# Patient Record
(demographics unavailable — no encounter records)

---

## 2024-11-25 NOTE — HISTORY OF PRESENT ILLNESS
[FreeTextEntry1] : New patient [de-identified] : Ms. CORETTA SALAS 79 year  female  with a PMH Hypothyroidism,  h/o breast cancer(s/p b/l mastectomy with reconstruction, s/p breast implant) GERD,  hemangioma, IBS, adrenal adenoma, h/o kidney stone  present to the office for a physical exam and to  establish care with a new PCP. Patient feel tired, fatigue recently had Herpes Zoster which was treated Valtrex for couple days.

## 2024-11-25 NOTE — REVIEW OF SYSTEMS
[TextEntry] : Constitutional: Denies fever,  c/o fatigue, has some   recent changes in the weight(gain weight) Head: C/o headache on/off,  denies  dizziness Eyes: Denies diplopia, tearing or pain Ears: Denies earache, tinnitus, hearing loss Nose: Denies nasal obstruction,  epistaxis Throat: Denies throat pain Neck: Denies stiffness, muscle tenderness Chest: Denies cough, SOB, wheezing, chest congestion CV: Denies chest pain,  c/o palpitation on/off GI: Denies abdominal pain, constipation,  c/o heartburn on/off Genitourinary: Denies dysuria, urinary urgency, c/o nocturia Musculoskeletal: Denies joint pain Neuro: Denies changes in mental status Psychiatric: Denies depressive symptoms, has some  change in sleep habits(wakes up at night to urinate)

## 2024-11-25 NOTE — PLAN
[FreeTextEntry1] : Ms. CORETTA SALAS 79 year  female  with a PMH Hypothyroidism,  h/o breast cancer(s/p b/l mastectomy with reconstruction, s/p breast implant) GERD,  hemangioma, IBS, adrenal adenoma, h/o kidney stone  present to the office for a physical exam. Well adult exam is performed. Recommend  to do a blood test(another day, fasting), further management will depend on the blood test results.  EKG was done and reviewed  Hypothyroidism continue  Synthroid 88 mcg qd Anxiety continue klonopin 1mg tid, prozac 10mg, effexor 37.5 GERD continue pepcid Do dexa test For a hemangioma do  an abdominal u/s Urine  incontinence continue myrbetrig- f/u with urologist F/u with dermatologist for a skin exam  RTC to f/u in 2 wks. Patient is verbalized understanding

## 2024-11-25 NOTE — HEALTH RISK ASSESSMENT
[Poor] : ~his/her~ current health as poor [Fair] :  ~his/her~ mood as fair [Yes] : Yes [2 - 4 times a month (2 pts)] : 2-4 times a month (2 points) [1 or 2 (0 pts)] : 1 or 2 (0 points) [Never (0 pts)] : Never (0 points) [No] : In the past 12 months have you used drugs other than those required for medical reasons? No [One fall no injury in past year] : Patient reported one fall in the past year without injury [Little interest or pleasure doing things] : 1) Little interest or pleasure doing things [Feeling down, depressed, or hopeless] : 2) Feeling down, depressed, or hopeless [0] : 2) Feeling down, depressed, or hopeless: Not at all (0) [PHQ-2 Negative - No further assessment needed] : PHQ-2 Negative - No further assessment needed [No Retinopathy] : No retinopathy [Patient reported PAP Smear was normal] : Patient reported PAP Smear was normal [Patient reported colonoscopy was normal] : Patient reported colonoscopy was normal [HIV test declined] : HIV test declined [Hepatitis C test declined] : Hepatitis C test declined [None] : None [# of Members in Household ___] :  household currently consist of [unfilled] member(s) [Retired] : retired [College] : College [] :  [# Of Children ___] : has [unfilled] children [Feels Safe at Home] : Feels safe at home [Fully functional (bathing, dressing, toileting, transferring, walking, feeding)] : Fully functional (bathing, dressing, toileting, transferring, walking, feeding) [Fully functional (using the telephone, shopping, preparing meals, housekeeping, doing laundry, using] : Fully functional and needs no help or supervision to perform IADLs (using the telephone, shopping, preparing meals, housekeeping, doing laundry, using transportation, managing medications and managing finances) [Reports changes in vision] : Reports changes in vision [Smoke Detector] : smoke detector [Carbon Monoxide Detector] : carbon monoxide detector [Safety elements used in home] : safety elements used in home [Seat Belt] :  uses seat belt [Sunscreen] : uses sunscreen [With Patient/Caregiver] : , with patient/caregiver [Designated Healthcare Proxy] : Designated healthcare proxy [Name: ___] : Health Care Proxy's Name: [unfilled]  [Relationship: ___] : Relationship: [unfilled] [Aggressive treatment] : aggressive treatment [I will adhere to the patient's wishes.] : I will adhere to the patient's wishes. [Time Spent: ___ minutes] : Time Spent: [unfilled] minutes [Never] : Never [de-identified] : no [de-identified] : cardiologist, ophthalmologist, gynecologist, urologist [Audit-CScore] : 2 [de-identified] : gardening [de-identified] : regular, avoid red meat [[id number]] : 0 [EyeExamDate] : 09/25/24 [Change in mental status noted] : No change in mental status noted [Language] : denies difficulty with language [Behavior] : denies difficulty with behavior [Learning/Retaining New Information] : denies difficulty learning/retaining new information [Handling Complex Tasks] : denies difficulty handling complex tasks [Reports changes in hearing] : Reports no changes in hearing [Reports normal functional visual acuity (ie: able to read med bottle)] : Reports poor functional visual acuity.  [Reports changes in dental health] : Reports no changes in dental health [MammogramDate] : 38 years ago [MammogramComments] : Was told do not need to do anymore [PapSmearDate] : 09/2024 [BoneDensityDate] : 2023 [BoneDensityComments] : Osteopenia as per patient [ColonoscopyDate] : 2021 [ColonoscopyComments] : did a Cologuard  in 2023 - neg for blood as per patient [de-identified] : with  [de-identified] : wear corrective glasses [AdvancecareDate] : 11/25/2024 [FreeTextEntry4] : John#[phone]

## 2024-11-25 NOTE — PHYSICAL EXAM
[TextEntry] : Constitutional: Well  developed, well appearing, not in acute distress Head: Normocephalic, no lesions Eyes: PERRLA, conjunctiva is NL, clear Ear: Ear canal is normal, tympanic membrane is intact Nose: Nasal turbinates are NL Throat: Clear, no exudates, no lesions Neck: Supple, no masses Chest: Lungs are clear, no rales, no rhonchi, no wheezing Heart: Regular rate, no murmurs, no rubs, no gallops Breast: b/l breast is symmetric, areola and nipple is NL, breast is multinodular, dense. Axillary LN is NL b/l Abdomen: Soft, no tenderness, no masses, bowel sounds are normal : No CVAT Extremities: FROM, no deformities, no edema Musculoskeletal: has no tenderness of the spine, ROM is NL Skin: Has multiple freckles on the back, has an age related skin changes on the back, upper and lower extremities, has a seborrheic keratotic lesion Neuro: AAO x 3, no focal neurological deficit. Psychiatric: oriented to person, place, and time and insight and judgment were intact.

## 2024-12-19 NOTE — HISTORY OF PRESENT ILLNESS
[FreeTextEntry1] : F/u exam [de-identified] : Ms. CORETTA SALAS 79 year female with a PMH Hypothyroidism, Hyperlipidemia, vitamin D,  osteopenia, h/o breast cancer(s/p b/l mastectomy with reconstruction, s/p breast implant) GERD, hemangioma, IBS, adrenal adenoma, h/o kidney stone present to the office for a f/u on MRI of the abdomen and pelvis. MRI of the abdomen pre and post contrast from 12/11/24 - Hepatic hemangioma 4.1 cm, 2.1 by 1.1 cm. . Has a large focus of fundal adenomyomatosis anteriorly. Patient currently is c/o itchy skin on the L side of the back mostly at night. In am itchy skin is disappears.  Recently has HZ on the R side of the back

## 2024-12-19 NOTE — PHYSICAL EXAM
[TextEntry] : Constitutional: Well developed, well appearing, not in acute distress Head: Normocephalic, no lesions Eyes: PERRLA, conjunctiva is NL, clear Ear: Ear canal is normal, tympanic membrane is intact Nose: Nasal turbinates are NL Throat: Clear, no exudates, no lesions Neck: Supple, no masses Chest: Lungs are clear, no rales, no rhonchi, no wheezing Heart: Regular rate, no murmurs Abdomen: Soft, no tenderness, no masses, bowel sounds are normal : No CVAT Skin: Has multiple freckles on the back, has an age related skin changes on the back, upper and lower extremities, has a seborrheic keratotic lesion on the back. Has no visible rash on the L side of the back Neuro: AAO x 3, no focal neurological deficit.

## 2024-12-19 NOTE — PLAN
[FreeTextEntry1] : Ms. CORETTA SALAS 79 year female with a PMH Hypothyroidism, Hyperlipidemia, vitamin D,  osteopenia  h/o breast cancer(s/p b/l mastectomy with reconstruction, s/p breast implant) GERD, hemangioma, IBS, adrenal adenoma, h/o kidney stone present to the office for a f/u on MRI of the abdomen and pelvis Result of the MRI  of the abdomen discussed with patient in detail Recommend to f/u with a surgeon for a gallbladder adenomyomatosis(copy of the MRI report was provided to the patient) For an itchy skin, recommend  to use elocon cream, if symptoms will persist, f/u with urologist Hypothyroidism continue Synthroid 88 mcg qd Anxiety continue klonopin 1mg tid, prozac 10mg, effexor 37.5 GERD continue pepcid Urine incontinence continue myrbetrig- f/u with urologist RTC for a f/u in 3 mo

## 2025-04-22 NOTE — PLAN
[FreeTextEntry1] : Ms. CORETTA SALAS 80 year female with a PMH Hypothyroidism, Hyperlipidemia, vitamin D,  osteopenia  h/o breast cancer(s/p b/l mastectomy with reconstruction, s/p breast implant) GERD, hemangioma, IBS, adrenal adenoma, h/o kidney stone present to the office for a f/u Result of the blood test discussed with the patient in detail.  Patient has  well controlled Hypothyroidism continue Synthroid 88 mcg qd Anxiety continue klonopin 1mg tid, prozac 10mg, effexor 37.5 Hyperlipidemia - continue low cholestrol diet Postnasal drip recommend to use  flonase GERD continue pepcid Urine incontinence continue myrbetrig- f/u with urologist. Will repeat u/a today. Has some blood in the urine Recommend to f/u with a surgeon for a gallbladder adenomyomatosis(copy of the MRI report was provided to the patient) RTC for a f/u in 3 mo

## 2025-04-22 NOTE — PHYSICAL EXAM
[TextEntry] : Constitutional: Well developed, well appearing, not in acute distress Head: Normocephalic, no lesions Eyes: PERRLA, conjunctiva is NL, clear Ear: Ear canal is normal, tympanic membrane is intact Nose: Nasal turbinates are NL Throat: Clear, no exudates, no lesions Neck: Supple, no masses Chest: Lungs are clear, no rales, no rhonchi, no wheezing Heart: Regular rate, no murmurs Abdomen: Soft, no tenderness, no masses, bowel sounds are normal : No CVAT Neuro: AAO x 3, no focal neurological deficit.

## 2025-04-22 NOTE — REVIEW OF SYSTEMS
[Earache] : no earache [Hearing Loss] : no hearing loss [Nasal Discharge] : no nasal discharge [Sore Throat] : no sore throat [Postnasal Drip] : postnasal drip [Negative] : Integumentary [FreeTextEntry2] : feels tired and fatique

## 2025-04-22 NOTE — HISTORY OF PRESENT ILLNESS
[FreeTextEntry1] : F/u exam [de-identified] : Ms. CORETTA SALAS 80 year female with a PMH Hypothyroidism, Hyperlipidemia, vitamin D,  osteopenia, h/o breast cancer(s/p b/l mastectomy with reconstruction, s/p breast implant) GERD, hemangioma, IBS, adrenal adenoma, h/o kidney stone present to the office for a f/u.   Patient feels a little tired(had a COVID 19 on 06/2024), also c/o postnasal drip. Denies throat pain, earache, cough, chest congestion. recently did a blood test on 04/05/2025 Did an MRI of the abdomen and pelvis on 12/11/24 - Hepatic hemangioma 4.1 cm, 2.1 by 1.1 cm. . Has a large focus of fundal adenomyomatosis anteriorly of the gallbladder

## 2025-07-16 NOTE — HISTORY OF PRESENT ILLNESS
[FreeTextEntry8] : Patient came with c/o feeling weak  since 007/05/2025- went to see MD at [facility] , found to have URI Came today with concern of runny nose and post nasal drip, hoarse of the voice for2 wks No c/o fever, no cough

## 2025-07-16 NOTE — REVIEW OF SYSTEMS
[Itching] : Itching [Skin Rash] : skin rash [TextEntry] : Head: Denies headache, dizziness Chest: Denies cough, SOB CV: Denies chest pain, palpitation Abdominal: Denies abdominal pain, change in bowel movement Neurology: Denies  changes in mental status, seizure, no neurological deficit

## 2025-07-16 NOTE — PHYSICAL EXAM
[de-identified] : left elbow itchy skin lesion [TextEntry] : General: alert, awake, oriented  X 3, Neck: Supple, no masses,  Chest: Lungs are  clear b/l , no rales, no rhonchi, no wheezes.  Heart: RR, no murmurs, no rubs, no gallops.  Extremities: FROM, no deformities, no edema, no erythema.  Straight leg raise test done- negative b/l

## 2025-07-22 NOTE — REVIEW OF SYSTEMS
[Postnasal Drip] : postnasal drip [Negative] : Heme/Lymph [Hoarseness] : hoarseness [Nasal Discharge] : nasal discharge [Cough] : cough [Earache] : no earache [Hearing Loss] : no hearing loss [Sore Throat] : no sore throat [Shortness Of Breath] : no shortness of breath [Wheezing] : no wheezing [FreeTextEntry2] : feels tired and fatique

## 2025-07-22 NOTE — PHYSICAL EXAM
[TextEntry] : Constitutional: Well developed, well appearing, not in acute distress Head: Normocephalic, no lesions Eyes: PERRLA, conjunctiva is NL, clear Ear: Ear canal is normal, tympanic membrane is intact Nose: Nasal turbinate are NL, has a mil frontal sinus tenderness Throat: Clear, no exudates, no lesions Neck: Supple, no masses Chest: Lungs are clear, no rales, no rhonchi, no wheezing Heart: Regular rate, no murmurs Abdomen: Soft, no tenderness, no masses, bowel sounds are normal : No CVAT Neuro: AAO x 3, no focal neurological deficit.

## 2025-07-22 NOTE — HISTORY OF PRESENT ILLNESS
[FreeTextEntry1] : F/u exam [de-identified] : Ms. CORETTA SALAS 80 year female with a PMH Hypothyroidism, Hyperlipidemia, vitamin D,  osteopenia, h/o breast cancer(s/p b/l mastectomy with reconstruction, s/p breast implant) GERD, hemangioma, IBS, adrenal adenoma, h/o kidney stone present to the office for a f/u.   Patient  is c/o dry cough, hoarseness, nasal congestion.  Did an MRI of the abdomen and pelvis on 12/11/24 - Hepatic hemangioma 4.1 cm, 2.1 by 1.1 cm. . Has a large focus of fundal adenomyomas anteriorly of the gallbladder